# Patient Record
Sex: FEMALE | Race: BLACK OR AFRICAN AMERICAN | URBAN - METROPOLITAN AREA
[De-identification: names, ages, dates, MRNs, and addresses within clinical notes are randomized per-mention and may not be internally consistent; named-entity substitution may affect disease eponyms.]

---

## 2018-07-16 NOTE — PROGRESS NOTES
[facility]) Surgical Oncology  [facility]    HPI: Dear Dr. Gaston Mustafa, Thank you for referring Ms. Hussein Huerta for management of right breast abcess. Patient was seen in the ER on 7/12/18 after noticing a painful area to her right upper inner breast that had been present for a couple day's. Patient was placed on 500 mg of Keflex Q. I.D for 10 days. States it is not getting better. Got bigger and more pain, sharp, 6/10. No fever and no drainage. No previous h/o abscess. History reviewed. No pertinent past medical history. History reviewed. No pertinent surgical history. Social:   Social History   Substance Use Topics    Smoking status: Never Smoker    Smokeless tobacco: Never Used    Alcohol use No     History reviewed. No pertinent family history. Allergies: Patient has no known allergies. Current Outpatient Prescriptions   Medication Sig Dispense Refill    Prenatal MV-Min-Fe Fum-FA-DHA (PRENATAL 1 PO) Take by mouth      cephALEXin (KEFLEX) 500 MG capsule Take 1 capsule by mouth 4 times daily for 10 days 40 capsule 0     No current facility-administered medications for this visit. Review of Systems: See HPI  All other systems reviewed and are negative. Vitals:    07/17/18 1424   BP: 122/72   Pulse: 85   Temp: 98.6 °F (37 °C)   TempSrc: Oral   SpO2: 97%   Weight: 156 lb (70.8 kg)   Height: 5' 2\" (1.575 m)     Wt Readings from Last 3 Encounters:   07/17/18 156 lb (70.8 kg)   07/12/18 156 lb 3.2 oz (70.9 kg)   05/30/17 180 lb (81.6 kg)     Body mass index is 28.53 kg/m². Physical Exam:   Constitutional: She is oriented to person, place, and time. She appears well-developed and well-nourished. No distress. HENT: Moist mucus membranes  Head: Normocephalic and atraumatic. Eyes: EOM are normal. Pupils are equal, round, and no icterus. Neck: Normal range of motion. Neck supple. No thyromegaly present. Cardiovascular: Normal rate and regular rhythm by peripheral pulses.     Pulmonary/Chest:

## 2018-07-17 ENCOUNTER — OFFICE VISIT (OUTPATIENT)
Dept: SURGERY | Age: 23
End: 2018-07-17

## 2018-07-17 VITALS
TEMPERATURE: 98.6 F | SYSTOLIC BLOOD PRESSURE: 122 MMHG | OXYGEN SATURATION: 97 % | WEIGHT: 156 LBS | BODY MASS INDEX: 28.71 KG/M2 | HEIGHT: 62 IN | DIASTOLIC BLOOD PRESSURE: 72 MMHG | HEART RATE: 85 BPM

## 2018-07-17 DIAGNOSIS — N61.1 ABSCESS OF RIGHT BREAST: Primary | ICD-10-CM

## 2018-07-17 PROCEDURE — G8419 CALC BMI OUT NRM PARAM NOF/U: HCPCS | Performed by: SURGERY

## 2018-07-17 PROCEDURE — 1036F TOBACCO NON-USER: CPT | Performed by: SURGERY

## 2018-07-17 PROCEDURE — 99203 OFFICE O/P NEW LOW 30 MIN: CPT | Performed by: SURGERY

## 2018-07-17 PROCEDURE — 19020 MASTOTOMY EXPL DRG ABSC DP: CPT | Performed by: SURGERY

## 2018-07-17 PROCEDURE — G8427 DOCREV CUR MEDS BY ELIG CLIN: HCPCS | Performed by: SURGERY
